# Patient Record
Sex: FEMALE | Race: WHITE | NOT HISPANIC OR LATINO | Employment: STUDENT | URBAN - METROPOLITAN AREA
[De-identification: names, ages, dates, MRNs, and addresses within clinical notes are randomized per-mention and may not be internally consistent; named-entity substitution may affect disease eponyms.]

---
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
## 2022-08-16 ENCOUNTER — MEDICAL CORRESPONDENCE (OUTPATIENT)
Dept: HEALTH INFORMATION MANAGEMENT | Facility: CLINIC | Age: 22
End: 2022-08-16

## 2022-08-17 ENCOUNTER — TRANSFERRED RECORDS (OUTPATIENT)
Dept: HEALTH INFORMATION MANAGEMENT | Facility: CLINIC | Age: 22
End: 2022-08-17

## 2022-08-17 LAB
ALT SERPL-CCNC: 24 U/L (ref 14–59)
AST SERPL-CCNC: 18 U/L (ref 0–45)
CREATININE (EXTERNAL): 0.81 MG/DL (ref 0.55–1.02)
GFR ESTIMATED (EXTERNAL): >60 ML/MIN/1.73M^2
GLUCOSE (EXTERNAL): 51 MG/DL (ref 70–124)
POTASSIUM (EXTERNAL): 4 MMOL/L (ref 3.4–5.3)

## 2022-08-18 ENCOUNTER — TRANSFERRED RECORDS (OUTPATIENT)
Dept: HEALTH INFORMATION MANAGEMENT | Facility: CLINIC | Age: 22
End: 2022-08-18

## 2022-08-19 DIAGNOSIS — G47.19 EXCESSIVE DAYTIME SLEEPINESS: Primary | ICD-10-CM

## 2023-06-08 ENCOUNTER — HOSPITAL ENCOUNTER (OUTPATIENT)
Dept: CARDIOLOGY | Facility: CLINIC | Age: 23
Discharge: HOME | End: 2023-06-08
Attending: FAMILY MEDICINE | Admitting: FAMILY MEDICINE
Payer: COMMERCIAL

## 2023-06-08 DIAGNOSIS — R01.1 MURMUR: ICD-10-CM

## 2023-06-08 DIAGNOSIS — R55 SYNCOPE: ICD-10-CM

## 2023-06-08 LAB — LVEF ECHO: NORMAL

## 2023-06-08 PROCEDURE — 93306 TTE W/DOPPLER COMPLETE: CPT

## 2023-06-08 PROCEDURE — 93306 TTE W/DOPPLER COMPLETE: CPT | Mod: 26 | Performed by: INTERNAL MEDICINE

## 2023-08-23 ENCOUNTER — APPOINTMENT (OUTPATIENT)
Dept: GENERAL RADIOLOGY | Facility: CLINIC | Age: 23
End: 2023-08-23
Attending: EMERGENCY MEDICINE
Payer: COMMERCIAL

## 2023-08-23 ENCOUNTER — HOSPITAL ENCOUNTER (EMERGENCY)
Facility: CLINIC | Age: 23
Discharge: HOME | End: 2023-08-23
Attending: EMERGENCY MEDICINE | Admitting: EMERGENCY MEDICINE
Payer: COMMERCIAL

## 2023-08-23 VITALS
OXYGEN SATURATION: 100 % | TEMPERATURE: 98.4 F | RESPIRATION RATE: 16 BRPM | DIASTOLIC BLOOD PRESSURE: 78 MMHG | SYSTOLIC BLOOD PRESSURE: 125 MMHG | HEART RATE: 92 BPM
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
## 2023-08-23 DIAGNOSIS — S80.212A ABRASION OF LEFT KNEE, INITIAL ENCOUNTER: ICD-10-CM

## 2023-08-23 DIAGNOSIS — S81.012A LACERATION OF LEFT KNEE, INITIAL ENCOUNTER: ICD-10-CM

## 2023-08-23 DIAGNOSIS — S41.111A LACERATION OF RIGHT UPPER EXTREMITY, INITIAL ENCOUNTER: ICD-10-CM

## 2023-08-23 PROCEDURE — 250N000013 HC RX MED GY IP 250 OP 250 PS 637: Performed by: EMERGENCY MEDICINE

## 2023-08-23 PROCEDURE — 12032 INTMD RPR S/A/T/EXT 2.6-7.5: CPT | Performed by: EMERGENCY MEDICINE

## 2023-08-23 PROCEDURE — 99283 EMERGENCY DEPT VISIT LOW MDM: CPT | Mod: 25 | Performed by: EMERGENCY MEDICINE

## 2023-08-23 PROCEDURE — 250N000009 HC RX 250

## 2023-08-23 PROCEDURE — 73562 X-RAY EXAM OF KNEE 3: CPT | Mod: LT

## 2023-08-23 PROCEDURE — 73562 X-RAY EXAM OF KNEE 3: CPT | Mod: 26 | Performed by: RADIOLOGY

## 2023-08-23 RX ORDER — LIDOCAINE HYDROCHLORIDE AND EPINEPHRINE 10; 10 MG/ML; UG/ML
INJECTION, SOLUTION INFILTRATION; PERINEURAL
Status: COMPLETED
Start: 2023-08-23 | End: 2023-08-23

## 2023-08-23 RX ORDER — IBUPROFEN 600 MG/1
600 TABLET, FILM COATED ORAL ONCE
Status: COMPLETED | OUTPATIENT
Start: 2023-08-23 | End: 2023-08-23

## 2023-08-23 RX ADMIN — IBUPROFEN 600 MG: 600 TABLET, FILM COATED ORAL at 03:51

## 2023-08-23 RX ADMIN — LIDOCAINE HYDROCHLORIDE,EPINEPHRINE BITARTRATE 20 ML: 10; .01 INJECTION, SOLUTION INFILTRATION; PERINEURAL at 04:44

## 2023-08-23 ASSESSMENT — ACTIVITIES OF DAILY LIVING (ADL): ADLS_ACUITY_SCORE: 35

## 2023-08-23 NOTE — ED TRIAGE NOTES
Triage Assessment       Row Name 08/23/23 0201       Triage Assessment (Adult)    Airway WDL WDL       Respiratory WDL    Respiratory WDL WDL       Skin Circulation/Temperature WDL    Skin Circulation/Temperature WDL WDL       Cardiac WDL    Cardiac WDL WDL       Peripheral/Neurovascular WDL    Peripheral Neurovascular WDL WDL       Cognitive/Neuro/Behavioral WDL    Cognitive/Neuro/Behavioral WDL WDL
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
## 2023-08-23 NOTE — DISCHARGE INSTRUCTIONS
Please follow-up in [facility] in 8 - 12 days for suture removal (total 7 stitches).   Please keep wound clean and dry.  Wash with soap and water.  Please take Tylenol or ibuprofen as needed for pain.  Return to the emergency department if high fever, increasing pain, significant redness, drainage from the wound, new or worsening symptoms.  It was a pleasure taking care of you today.  We hope you feel better soon.

## 2024-04-20 ENCOUNTER — ANCILLARY PROCEDURE (OUTPATIENT)
Dept: CT IMAGING | Facility: CLINIC | Age: 24
End: 2024-04-20
Attending: FAMILY MEDICINE
Payer: COMMERCIAL

## 2024-04-20 DIAGNOSIS — R59.1 LYMPHADENOPATHY: ICD-10-CM

## 2024-04-20 DIAGNOSIS — R59.0 LYMPHADENOPATHY OF RIGHT CERVICAL REGION: ICD-10-CM

## 2024-04-20 PROCEDURE — 70490 CT SOFT TISSUE NECK W/O DYE: CPT | Performed by: STUDENT IN AN ORGANIZED HEALTH CARE EDUCATION/TRAINING PROGRAM

## 2024-07-10 DIAGNOSIS — I88.9 CERVICAL LYMPHADENITIS: Primary | ICD-10-CM

## 2024-08-05 ENCOUNTER — APPOINTMENT (OUTPATIENT)
Dept: ULTRASOUND IMAGING | Age: 24
End: 2024-08-05
Attending: OTOLARYNGOLOGY

## 2024-08-12 DIAGNOSIS — I88.9 CERVICAL LYMPHADENITIS: Primary | ICD-10-CM
